# Patient Record
Sex: FEMALE | Race: WHITE | URBAN - METROPOLITAN AREA
[De-identification: names, ages, dates, MRNs, and addresses within clinical notes are randomized per-mention and may not be internally consistent; named-entity substitution may affect disease eponyms.]

---

## 2018-12-12 ENCOUNTER — OFFICE VISIT (OUTPATIENT)
Dept: ENDOCRINOLOGY | Age: 78
End: 2018-12-12

## 2018-12-12 VITALS
TEMPERATURE: 97.2 F | HEART RATE: 75 BPM | WEIGHT: 182 LBS | SYSTOLIC BLOOD PRESSURE: 144 MMHG | RESPIRATION RATE: 14 BRPM | BODY MASS INDEX: 30.32 KG/M2 | HEIGHT: 65 IN | OXYGEN SATURATION: 100 % | DIASTOLIC BLOOD PRESSURE: 58 MMHG

## 2018-12-12 DIAGNOSIS — E04.2 NONTOXIC MULTINODULAR GOITER: Primary | ICD-10-CM

## 2018-12-12 RX ORDER — BISMUTH SUBSALICYLATE 262 MG
1 TABLET,CHEWABLE ORAL DAILY
COMMUNITY

## 2018-12-12 NOTE — PROGRESS NOTES
Mark Alas is a 78 y.o. female here for   Chief Complaint   Patient presents with    Thyroid Problem       1. Have you been to the ER, urgent care clinic since your last visit? Hospitalized since your last visit? -no    2. Have you seen or consulted any other health care providers outside of the [address] [place] [address] since your last visit? Include any pap smears or colon screening. -PCP and Cardiovascular surgeon Dr. Hebert Schwartz

## 2018-12-16 NOTE — PROGRESS NOTES
Thyroid Ultrasound Report    Patient Information  Date:12/16/2018  Name : Louis Martini 70 y.o.     YOB: 1940         Referred by: Regina Armenta NP ,     Indication: Multinodular goiter    Multiple real time longitudinal and transverse images were obtained using a high  resolution ultrasound with a Linear transducer. Right thyroid lobe measures 5.2 x 1.8 x 1.6 cm. Left lobe measures 3.4 x 1.4 x 1.5 cm  Isthmus measures 0.23 cm  Bilaterally there are subcentimeter nodules largest in the right lobe measuring 0.7 cm and one in the left lobe measuring 0.5 cm. No microcalcifications or hypervascularity      Impression:    Multinodular goiter  Subcentimeter thyroid nodules which has no high-risk characteristics.   No fine-needle aspiration biopsy needed  No routine follow-up ultrasound needed     Suri Alvarez MD

## 2019-06-05 NOTE — PROGRESS NOTES
Michell Carrillo MD            Patient Information  Date:12/12/2018  Name : Lorraine Gasca 78 y.o.     YOB: 1940         Referred by: Tish Baumgarten, NP         Chief Complaint   Patient presents with    Thyroid Problem       History of present illness    Lorraine Gasca is a 78 y.o. female  here for follow up of thyroid nodules. Thyroid ultrasound 9/13 showed multinodular goiter subcentimeter nodules bilaterally. No change in the size of the neck or neck pain. No dysphagia,dysphonia or dyspnea. She reports to have had nodules since 1990 which was initially found on a life scan  Denies nervousness,shakiness,palpitations  No constipation or cold intolerance/heat intolerance  No history of known radiation exposure  No FH of thyroid disease. No FH of thyroid cancer     Past Medical History:   Diagnosis Date    Blurred vision     Diabetes ([address]     Hypertension     Nontoxic multinodular goiter        Current Outpatient Medications   Medication Sig    multivitamin (ONE A DAY) tablet Take 1 Tab by mouth daily.  atorvastatin (LIPITOR) 20 mg tablet Take 20 mg by mouth daily.  amLODIPine (NORVASC) 10 mg tablet Take 10 mg by mouth daily.  clopidogrel (PLAVIX) 75 mg tablet Take  by mouth daily.  losartan-hydrochlorothiazide (HYZAAR) 100-12.5 mg per tablet Take 1 Tab by mouth daily.  metFORMIN (GLUCOPHAGE) 500 mg tablet Take  by mouth two (2) times daily (with meals).  nebivolol (BYSTOLIC) 10 mg tablet Take 1 Tab by mouth daily. No current facility-administered medications for this visit.           Review of Systems:  - Constitutional Symptoms: no fevers, chills, weight loss  - Eyes: no blurry vision or double vision  - Cardiovascular: no chest pain or palpitations  - Respiratory: no cough or shortness of breath  - Gastrointestinal: no dysphagia or abdominal pain  - Musculoskeletal: no joint pains or weakness  - Integumentary: no vanessa    Physical Examination:  - Blood pressure 144/58, pulse 75, temperature 97.2 °F (36.2 °C), temperature source Oral, resp. rate 14, height 5' 5\" (1.651 m), weight 182 lb (82.6 kg), SpO2 100 %. Body mass index is 30.29 kg/m². - General: pleasant, no distress, good eye contact  - HEENT: no exopthalmos, no periorbital edema, no scleral/conjunctival injection, EOMI, no lid lag or stare  - Neck: supple, no thyromegaly,no nodules palpable  - Cardiovascular: regular,  normal S1 and S2  - Respiratory: clear to auscultation bilaterally  - Gastrointestinal: soft, nontender, nondistended, BS +  - Musculoskeletal: no tremors, no edema  - Neurological: alert and oriented   - Psychiatric: normal mood and affect  - Skin - normal turgor    Data Reviewed:     Lab Results   Component Value Date/Time    TSH 1.270 07/12/2016 09:48 AM      TSH 1.4   Thyroid ultrasound from September 2013-right lobe measures 5.6 x 1.2 x 2.3 cm and there is a 0.8 x 0.5 x 0.6 cm solid nodule. Left lobe measures 4.6 x 1.5 x 1.4, solid nodules measuring 0.5 cm and cystic nodules measuring 0.3 cm    [x] Reviewed labs    Assessment/Plan:     Multinodular goiter-she is euthyroid. Ultrasound thyroid December 2018 showed subcentimeter thyroid nodules, some of the are thyroid cyst.  No need for any intervention. Since this has been chronic, there is no need for follow-up ultrasound also. Discussed the compressive symptoms and to return as needed if she has any. HTN - controlled     Follow-up Disposition: Not on File    Thank you for allowing me to participate in the care of this patient.     Jovanny Stringer MD Pt lives in a private house w/ 3 steps to enter w/ B/L handrails, once inside pt can stay on the main level. PTA pt (I) w/ functional mobility w/ use of straight cane receives some assist from son for ADL's as needed. Pt has a shower chair.

## 2025-03-20 ENCOUNTER — TRANSCRIBE ORDERS (OUTPATIENT)
Facility: HOSPITAL | Age: 85
End: 2025-03-20

## 2025-03-20 DIAGNOSIS — M81.0 AGE-RELATED OSTEOPOROSIS WITHOUT CURRENT PATHOLOGICAL FRACTURE: Primary | ICD-10-CM
